# Patient Record
Sex: FEMALE | Race: WHITE | NOT HISPANIC OR LATINO | Employment: OTHER | URBAN - METROPOLITAN AREA
[De-identification: names, ages, dates, MRNs, and addresses within clinical notes are randomized per-mention and may not be internally consistent; named-entity substitution may affect disease eponyms.]

---

## 2022-10-02 ENCOUNTER — APPOINTMENT (OUTPATIENT)
Dept: RADIOLOGY | Facility: MEDICAL CENTER | Age: 82
End: 2022-10-02
Attending: EMERGENCY MEDICINE
Payer: MEDICARE

## 2022-10-02 ENCOUNTER — HOSPITAL ENCOUNTER (EMERGENCY)
Facility: MEDICAL CENTER | Age: 82
End: 2022-10-02
Attending: EMERGENCY MEDICINE
Payer: MEDICARE

## 2022-10-02 VITALS
DIASTOLIC BLOOD PRESSURE: 77 MMHG | BODY MASS INDEX: 21.34 KG/M2 | WEIGHT: 125 LBS | TEMPERATURE: 97.9 F | HEART RATE: 76 BPM | HEIGHT: 64 IN | OXYGEN SATURATION: 96 % | SYSTOLIC BLOOD PRESSURE: 106 MMHG | RESPIRATION RATE: 17 BRPM
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
## 2022-10-02 DIAGNOSIS — S70.01XA CONTUSION OF RIGHT HIP, INITIAL ENCOUNTER: ICD-10-CM

## 2022-10-02 DIAGNOSIS — F41.9 ANXIETY: ICD-10-CM

## 2022-10-02 PROCEDURE — 700111 HCHG RX REV CODE 636 W/ 250 OVERRIDE (IP): Performed by: EMERGENCY MEDICINE

## 2022-10-02 PROCEDURE — 700101 HCHG RX REV CODE 250: Performed by: EMERGENCY MEDICINE

## 2022-10-02 PROCEDURE — 73560 X-RAY EXAM OF KNEE 1 OR 2: CPT | Mod: RT

## 2022-10-02 PROCEDURE — 73501 X-RAY EXAM HIP UNI 1 VIEW: CPT | Mod: RT

## 2022-10-02 PROCEDURE — 90715 TDAP VACCINE 7 YRS/> IM: CPT | Performed by: EMERGENCY MEDICINE

## 2022-10-02 PROCEDURE — 90471 IMMUNIZATION ADMIN: CPT

## 2022-10-02 PROCEDURE — 99284 EMERGENCY DEPT VISIT MOD MDM: CPT

## 2022-10-02 PROCEDURE — 304217 HCHG IRRIGATION SYSTEM

## 2022-10-02 PROCEDURE — 700102 HCHG RX REV CODE 250 W/ 637 OVERRIDE(OP): Performed by: EMERGENCY MEDICINE

## 2022-10-02 PROCEDURE — 303747 HCHG EXTRA SUTURE

## 2022-10-02 PROCEDURE — A9270 NON-COVERED ITEM OR SERVICE: HCPCS | Performed by: EMERGENCY MEDICINE

## 2022-10-02 PROCEDURE — 71045 X-RAY EXAM CHEST 1 VIEW: CPT

## 2022-10-02 PROCEDURE — 304999 HCHG REPAIR-SIMPLE/INTERMED LEVEL 1

## 2022-10-02 PROCEDURE — 96365 THER/PROPH/DIAG IV INF INIT: CPT | Mod: XU

## 2022-10-02 RX ORDER — CLINDAMYCIN PHOSPHATE 900 MG/50ML
900 INJECTION, SOLUTION INTRAVENOUS ONCE
Status: COMPLETED | OUTPATIENT
Start: 2022-10-02 | End: 2022-10-02

## 2022-10-02 RX ORDER — QUETIAPINE FUMARATE 50 MG/1
50 TABLET, FILM COATED ORAL
Qty: 30 TABLET | Refills: 0 | Status: SHIPPED | OUTPATIENT
Start: 2022-10-02 | End: 2022-11-01

## 2022-10-02 RX ORDER — CLINDAMYCIN HYDROCHLORIDE 150 MG/1
450 CAPSULE ORAL 3 TIMES DAILY
Qty: 63 CAPSULE | Refills: 0 | Status: SHIPPED | OUTPATIENT
Start: 2022-10-02 | End: 2022-10-09

## 2022-10-02 RX ORDER — LORAZEPAM 1 MG/1
1 TABLET ORAL ONCE
Status: COMPLETED | OUTPATIENT
Start: 2022-10-02 | End: 2022-10-02

## 2022-10-02 RX ORDER — LORAZEPAM 0.5 MG/1
0.5 TABLET ORAL EVERY 6 HOURS PRN
Qty: 12 TABLET | Refills: 0 | Status: SHIPPED | OUTPATIENT
Start: 2022-10-02 | End: 2022-10-05

## 2022-10-02 RX ADMIN — LIDOCAINE HYDROCHLORIDE 20 ML: 10 INJECTION, SOLUTION EPIDURAL; INFILTRATION; INTRACAUDAL; PERINEURAL at 19:15

## 2022-10-02 RX ADMIN — CLOSTRIDIUM TETANI TOXOID ANTIGEN (FORMALDEHYDE INACTIVATED), CORYNEBACTERIUM DIPHTHERIAE TOXOID ANTIGEN (FORMALDEHYDE INACTIVATED), BORDETELLA PERTUSSIS TOXOID ANTIGEN (GLUTARALDEHYDE INACTIVATED), BORDETELLA PERTUSSIS FILAMENTOUS HEMAGGLUTININ ANTIGEN (FORMALDEHYDE INACTIVATED), BORDETELLA PERTUSSIS PERTACTIN ANTIGEN, AND BORDETELLA PERTUSSIS FIMBRIAE 2/3 ANTIGEN 0.5 ML: 5; 2; 2.5; 5; 3; 5 INJECTION, SUSPENSION INTRAMUSCULAR at 18:45

## 2022-10-02 RX ADMIN — LORAZEPAM 1 MG: 1 TABLET ORAL at 18:45

## 2022-10-02 RX ADMIN — CLINDAMYCIN PHOSPHATE 900 MG: 900 INJECTION, SOLUTION INTRAVENOUS at 18:44

## 2022-10-03 NOTE — ED PROVIDER NOTES
ED Provider Note        Primary care provider: No primary care provider on file.    I verified that the patient was wearing a mask and I was wearing appropriate PPE every time I entered the room. The patient's mask was on the patient at all times during my encounter except for a brief view of the oropharynx.      CHIEF COMPLAINT  Chief Complaint   Patient presents with    Dog Bite     Pt has dog bite to right knee, pt states dog is known and is vaccinated for rabies, dog knocked pt down and she fell on her right hip       HPI  Delmy Laguna is a 81 y.o. female who presents to the Emergency Department with chief complaint of dog bite.  Patient was at a dog show today when she was bitten on the right knee by another show dog.  Patient states she knows this dog is well cared for up-to-date on vaccination she was also knocked to the ground.  She is having pain in her right knee and right hip and right side of her chest.  No head injury no loss of consciousness she is on no blood thinners she states that she is otherwise healthy with the exception of some psychiatric issues states that she currently takes Ativan 4 times a day and takes Seroquel at bedtime.  No other recent concerns no headache no neck pain no cough congestion chest pain shortness of breath she is not having any abdominal pain minimal pain in the right hip and pelvis and moderate pain in the right knee which is worse with any attempted movement or manipulation/ambulation.  No alleviating factors.    REVIEW OF SYSTEMS  10 systems reviewed and otherwise negative, pertinent positives and negatives listed in the history of present illness.    PAST MEDICAL HISTORY  Anxiety    SURGICAL HISTORY  patient denies any surgical history    SOCIAL HISTORY      Social History     Substance and Sexual Activity   Drug Use Not on file       FAMILY HISTORY  Non-Contributory    CURRENT MEDICATIONS  Home Medications    **Home medications have not yet been reviewed for this  "encounter**         ALLERGIES  Allergies   Allergen Reactions    Penicillin G        PHYSICAL EXAM  VITAL SIGNS: /82   Pulse 71   Temp 36.4 °C (97.6 °F) (Temporal)   Resp 16   Ht 1.626 m (5' 4\")   Wt 56.7 kg (125 lb)   SpO2 94%   BMI 21.46 kg/m²   Pulse ox interpretation: I interpret this pulse ox as normal.  Constitutional: Alert and oriented x 3, Distress  HEENT: Atraumatic normocephalic, pupils are equal round, extraocular movements are intact. The nares is clear, external ears are normal, mouth shows moist mucous membranes  Neck: no obvious JVD or tracheal deviation  Cardiovascular: Regular rate and rhythm no murmur rub or gallop   Thorax & Lungs: No respiratory distress, no wheezes rales or rhonchi, No chest tenderness.   GI: Soft nontender nondistended positive bowel sounds, no peritoneal signs    Skin: Superficial puncture wounds over the anterior patella there does not seem to be any joint capsule involvement.  Patient also has a jagged 5 cm laceration over the medial thigh no active bleeding no expanding hematoma or pulsatile bleeding normal cap refill and sensation distally  Musculoskeletal: Skin changes of the right leg as above.  Patient has minor tenderness about the right hip minor tenderness to the knee with flexion extension normal right ankle dorsiflexion plantarflexion normal cap refill and sensation right foot left lower and bilateral upper extremities are unremarkable.  Neurologic: Cranial nerves III through XII are grossly intact, no sensory deficit, no cerebellar dysfunction   Psychiatric: Appropriate affect for situation at this time      DIAGNOSTIC STUDIES / PROCEDURES  LABS      RADIOLOGY  DX-KNEE 3 VIEWS LEFT    (Results Pending)   DX-HIP-UNILATERAL-WITH PELVIS-1 VIEW RIGHT    (Results Pending)   DX-CHEST-PORTABLE (1 VIEW)    (Results Pending)         COURSE & MEDICAL DECISION MAKING  Pertinent Labs & Imaging studies reviewed. (See chart for details)    5:49 PM - Patient seen " and examined at bedside.     Laceration Repair Procedure Note    Indication: Laceration    Procedure: The patient was placed in the appropriate position and anesthesia around the laceration was obtained by infiltration using 1% Lidocaine without epinephrine. The area was then irrigated with high pressure normal saline.  No this wound was secondary to a dog bite it has large flap/defect therefore it was tacked down/loosely approximated 3 separate areas with 3-0 Ethilon using interrupted sutures.  It was not fully closed as intermediate sections were left open to heal by secondary intent.  There were no additional lacerations requiring repair. The wound area was then dressed with a bandage.      Total repaired wound length: 5 cm.     Other Items: Suture count: 3    The patient tolerated the procedure well.    Complications: None         Medical Decision Making: Patient is allergic to penicillin she is given an IV dose of clindamycin and should be prescribed clindamycin is given a couple of Norco's for the next few days to manage pain and she does have significant bite wound.  Dog was a show dog, fully vaccinated, there is no concern for rabies at this time.  Patient given instructions to change bandages daily to return for worsening redness pain swelling drainage fevers chills nausea vomiting any other acute symptom change or concern otherwise discharged in stable and improved condition.  Patient is from out of town and is been unable to get her Ativan and Seroquel prescriptions.  Anxiety likely exacerbated by the situation and due to doses that she has been taking I did discuss with concerns of possible benzodiazepine withdrawal therefore she was given a small prescription for Ativan and Seroquel will follow-up with her psychiatrist at the next available time return here for worsening pain redness swelling drainage fevers chills nausea vomiting any other acute concerns otherwise follow-up in 7 days for suture removal  "and wound check discharged in stable and improved condition.    /82   Pulse 71   Temp 36.4 °C (97.6 °F) (Temporal)   Resp 16   Ht 1.626 m (5' 4\")   Wt 56.7 kg (125 lb)   SpO2 94%   BMI 21.46 kg/m²     [facility], Emergency Dept  [address] [facility] [address] [address] [phone]  In 7 days  For suture removal, For wound re-check    [facility], Emergency Dept  [address] [address] [phone]    in 12-24 hours if symptoms persist, immediately If symptoms worsen, or if you develop any other symptoms or concerns    Discharge Medication List as of 10/2/2022  9:32 PM        START taking these medications    Details   clindamycin (CLEOCIN) 150 MG Cap Take 3 Capsules by mouth 3 times a day for 7 days., Disp-63 Capsule, R-0, Normal      LORazepam (ATIVAN) 0.5 MG Tab Take 1 Tablet by mouth every 6 hours as needed for Anxiety for up to 3 days., Disp-12 Tablet, R-0, Normal      QUEtiapine (SEROQUEL) 50 MG tablet Take 1 Tablet by mouth at bedtime for 30 days., Disp-30 Tablet, R-0, Normal             FINAL IMPRESSION  1. Dog bite, initial encounter Active   2. Contusion of right hip, initial encounter Active   3. Dog bite, initial encounter    4. Anxiety           This dictation has been created using voice recognition software and/or scribes. The accuracy of the dictation is limited by the abilities of the software and the expertise of the scribes. I expect there may be some errors of grammar and possibly content. I made every attempt to manually correct the errors within my dictation. However, errors related to voice recognition software and/or scribes may still exist and should be interpreted within the appropriate context.            "

## 2022-10-03 NOTE — ED NOTES
Assist RN: Wound dressed per ERP request, patient given clean pants, PIV removed, awaiting ride home.

## 2022-10-03 NOTE — ED NOTES
Pt discharged in stable condition and ambulates to lobby with a steady gait. No IV to remove and no needs at this time

## 2022-10-03 NOTE — ED TRIAGE NOTES
Chief Complaint   Patient presents with    Dog Bite     Pt has dog bite to right knee, pt states dog is known and is vaccinated for rabies, dog knocked pt down and she fell on her right hip